# Patient Record
(demographics unavailable — no encounter records)

---

## 2024-10-30 NOTE — HISTORY OF PRESENT ILLNESS
[FreeTextEntry1] : 52 yo Male hx of ESRD on HD via his RUE AV Graft presents to the office today after having his AVG de-clotted by the vein center on Friday due to difficulty cannulating his AVG for HD.  He was able to get HD on Saturday.  He was still advised to come into office to have his AVG assessed to determine if he would need further vascular intervention.

## 2024-10-30 NOTE — PHYSICAL EXAM
[Normal Breath Sounds] : Normal breath sounds [2+] : right 2+ [de-identified] : Appears well [de-identified] : Right upper extremity AV graft with excellent thrill. No concerning swelling noted.

## 2024-10-30 NOTE — ASSESSMENT
[FreeTextEntry1] : RUE AVG US completed- Patent flow throughout.   Patient will f/u in Dec for RUE AVG US.  Given there was a recent occlusion.  Instructed to call the office if there is any issues with hemodialysis access.